# Patient Record
Sex: MALE | Race: WHITE | NOT HISPANIC OR LATINO
[De-identification: names, ages, dates, MRNs, and addresses within clinical notes are randomized per-mention and may not be internally consistent; named-entity substitution may affect disease eponyms.]

---

## 2023-03-10 PROBLEM — Z00.00 ENCOUNTER FOR PREVENTIVE HEALTH EXAMINATION: Status: ACTIVE | Noted: 2023-03-10

## 2023-03-13 ENCOUNTER — NON-APPOINTMENT (OUTPATIENT)
Age: 23
End: 2023-03-13

## 2023-03-13 ENCOUNTER — APPOINTMENT (OUTPATIENT)
Dept: OTOLARYNGOLOGY | Facility: CLINIC | Age: 23
End: 2023-03-13
Payer: COMMERCIAL

## 2023-03-13 VITALS
WEIGHT: 170 LBS | DIASTOLIC BLOOD PRESSURE: 92 MMHG | HEART RATE: 92 BPM | BODY MASS INDEX: 24.07 KG/M2 | HEIGHT: 70.5 IN | SYSTOLIC BLOOD PRESSURE: 127 MMHG | TEMPERATURE: 98.3 F

## 2023-03-13 DIAGNOSIS — R04.0 EPISTAXIS: ICD-10-CM

## 2023-03-13 PROCEDURE — 31231 NASAL ENDOSCOPY DX: CPT

## 2023-03-13 PROCEDURE — 99204 OFFICE O/P NEW MOD 45 MIN: CPT | Mod: 25

## 2023-03-13 NOTE — ASSESSMENT
[FreeTextEntry1] : 23-year-old gentleman who presents with concern for 1 month history of epistaxis.  On exam today there appears to be some inflammation and excoriation of the nasal septum and mucocutaneous junction bilaterally.  This is already been improving with conservative treatments over the past 1 month.  At this time I am recommending epistaxis protocol, handout given to patient.  Follow-up in 3 weeks, sooner should symptoms worsen or fail to improve.  Should symptoms persist may entertain idea folliculitis and treat with Bactrim and mupirocin.\par \par –Epistaxis protocol\par – Follow-up 3 weeks\par – Should symptoms persist can consider Bactrim and mupirocin

## 2023-03-13 NOTE — HISTORY OF PRESENT ILLNESS
[de-identified] : 3/13/23\par 24 yo male who presents with nasal pain and epistaxis.  Epistaxis has been presents x 1 mo.  This is occurring 1 per day and can happen bilaterally.  Will last for 5 min.  +Congestion.  No facial pain or dental pain.  +Jaw soreness at times.  No changes in smell or taste.  For treatment he has tried some ointment/Neosporin which he will do once per day.  With the epistaxis, he will roll up tissue and put in the nares.  He did have his nose cauterized previously for a similar issue 4 years ago.\par \par  no ENT issues otherwise

## 2023-03-13 NOTE — PHYSICAL EXAM
[Nasal Endoscopy Performed] : nasal endoscopy was performed, see procedure section for findings [de-identified] :  + turbinate hypertrophy [de-identified] :  evidence of excoriation and ulceration at the mucocutaneous junction bilaterally [Midline] : trachea located in midline position [Normal] : no rashes

## 2023-04-03 ENCOUNTER — APPOINTMENT (OUTPATIENT)
Dept: OTOLARYNGOLOGY | Facility: CLINIC | Age: 23
End: 2023-04-03

## 2023-07-17 ENCOUNTER — OUTPATIENT (OUTPATIENT)
Dept: OUTPATIENT SERVICES | Facility: HOSPITAL | Age: 23
LOS: 1 days | End: 2023-07-17
Payer: COMMERCIAL

## 2023-07-17 ENCOUNTER — RESULT REVIEW (OUTPATIENT)
Age: 23
End: 2023-07-17

## 2023-07-17 ENCOUNTER — APPOINTMENT (OUTPATIENT)
Dept: ORTHOPEDIC SURGERY | Facility: CLINIC | Age: 23
End: 2023-07-17
Payer: COMMERCIAL

## 2023-07-17 ENCOUNTER — APPOINTMENT (OUTPATIENT)
Dept: RADIOLOGY | Facility: CLINIC | Age: 23
End: 2023-07-17

## 2023-07-17 DIAGNOSIS — G89.29 PAIN IN RIGHT SHOULDER: ICD-10-CM

## 2023-07-17 DIAGNOSIS — S43.431A SUPERIOR GLENOID LABRUM LESION OF RIGHT SHOULDER, INITIAL ENCOUNTER: ICD-10-CM

## 2023-07-17 DIAGNOSIS — M25.511 PAIN IN RIGHT SHOULDER: ICD-10-CM

## 2023-07-17 DIAGNOSIS — G25.89 OTHER SPECIFIED EXTRAPYRAMIDAL AND MOVEMENT DISORDERS: ICD-10-CM

## 2023-07-17 PROCEDURE — 73030 X-RAY EXAM OF SHOULDER: CPT | Mod: 26,RT

## 2023-07-17 PROCEDURE — 99204 OFFICE O/P NEW MOD 45 MIN: CPT

## 2025-05-14 ENCOUNTER — OUTPATIENT (OUTPATIENT)
Dept: OUTPATIENT SERVICES | Facility: HOSPITAL | Age: 25
LOS: 1 days | End: 2025-05-14

## 2025-05-14 ENCOUNTER — RESULT REVIEW (OUTPATIENT)
Age: 25
End: 2025-05-14

## 2025-05-14 ENCOUNTER — APPOINTMENT (OUTPATIENT)
Dept: RADIOLOGY | Facility: CLINIC | Age: 25
End: 2025-05-14
Payer: COMMERCIAL

## 2025-05-14 PROCEDURE — 73110 X-RAY EXAM OF WRIST: CPT | Mod: 26,RT
